# Patient Record
Sex: FEMALE | Race: WHITE | Employment: FULL TIME | URBAN - METROPOLITAN AREA
[De-identification: names, ages, dates, MRNs, and addresses within clinical notes are randomized per-mention and may not be internally consistent; named-entity substitution may affect disease eponyms.]

---

## 2022-09-05 ENCOUNTER — HOSPITAL ENCOUNTER (INPATIENT)
Age: 52
LOS: 2 days | Discharge: HOME | DRG: 446 | End: 2022-09-07
Attending: EMERGENCY MEDICINE | Admitting: HOSPITALIST
Payer: COMMERCIAL

## 2022-09-05 ENCOUNTER — APPOINTMENT (OUTPATIENT)
Dept: GENERAL RADIOLOGY | Age: 52
DRG: 446 | End: 2022-09-05
Payer: COMMERCIAL

## 2022-09-05 ENCOUNTER — APPOINTMENT (OUTPATIENT)
Dept: CT IMAGING | Age: 52
DRG: 446 | End: 2022-09-05
Payer: COMMERCIAL

## 2022-09-05 DIAGNOSIS — N83.202 LEFT OVARIAN CYST: ICD-10-CM

## 2022-09-05 DIAGNOSIS — R79.89 ELEVATED LFTS: ICD-10-CM

## 2022-09-05 DIAGNOSIS — K81.0 ACUTE CHOLECYSTITIS: Primary | ICD-10-CM

## 2022-09-05 LAB
A/G RATIO: 1.6 (ref 1.1–2.2)
ALBUMIN SERPL-MCNC: 4.6 G/DL (ref 3.4–5)
ALP BLD-CCNC: 160 U/L (ref 40–129)
ALT SERPL-CCNC: 88 U/L (ref 10–40)
ANION GAP SERPL CALCULATED.3IONS-SCNC: 9 MMOL/L (ref 3–16)
AST SERPL-CCNC: 198 U/L (ref 15–37)
BASOPHILS ABSOLUTE: 0 K/UL (ref 0–0.2)
BASOPHILS RELATIVE PERCENT: 0.6 %
BILIRUB SERPL-MCNC: 0.6 MG/DL (ref 0–1)
BILIRUBIN URINE: NEGATIVE
BLOOD, URINE: NEGATIVE
BUN BLDV-MCNC: 11 MG/DL (ref 7–20)
CALCIUM SERPL-MCNC: 9.7 MG/DL (ref 8.3–10.6)
CHLORIDE BLD-SCNC: 103 MMOL/L (ref 99–110)
CLARITY: CLEAR
CO2: 24 MMOL/L (ref 21–32)
COLOR: YELLOW
CREAT SERPL-MCNC: 0.6 MG/DL (ref 0.6–1.1)
EOSINOPHILS ABSOLUTE: 0.1 K/UL (ref 0–0.6)
EOSINOPHILS RELATIVE PERCENT: 1.4 %
GFR AFRICAN AMERICAN: >60
GFR NON-AFRICAN AMERICAN: >60
GLUCOSE BLD-MCNC: 115 MG/DL (ref 70–99)
GLUCOSE BLD-MCNC: 122 MG/DL (ref 70–99)
GLUCOSE URINE: NEGATIVE MG/DL
HCT VFR BLD CALC: 38.5 % (ref 36–48)
HEMOGLOBIN: 13.1 G/DL (ref 12–16)
KETONES, URINE: NEGATIVE MG/DL
LEUKOCYTE ESTERASE, URINE: NEGATIVE
LIPASE: 26 U/L (ref 13–60)
LYMPHOCYTES ABSOLUTE: 1.2 K/UL (ref 1–5.1)
LYMPHOCYTES RELATIVE PERCENT: 18.1 %
MCH RBC QN AUTO: 28.4 PG (ref 26–34)
MCHC RBC AUTO-ENTMCNC: 34 G/DL (ref 31–36)
MCV RBC AUTO: 83.3 FL (ref 80–100)
MICROSCOPIC EXAMINATION: NORMAL
MONOCYTES ABSOLUTE: 0.4 K/UL (ref 0–1.3)
MONOCYTES RELATIVE PERCENT: 5.9 %
NEUTROPHILS ABSOLUTE: 4.9 K/UL (ref 1.7–7.7)
NEUTROPHILS RELATIVE PERCENT: 74 %
NITRITE, URINE: NEGATIVE
PDW BLD-RTO: 14.1 % (ref 12.4–15.4)
PERFORMED ON: ABNORMAL
PH UA: 7 (ref 5–8)
PLATELET # BLD: 259 K/UL (ref 135–450)
PMV BLD AUTO: 7.7 FL (ref 5–10.5)
POTASSIUM REFLEX MAGNESIUM: 3.9 MMOL/L (ref 3.5–5.1)
PROTEIN UA: NEGATIVE MG/DL
RBC # BLD: 4.62 M/UL (ref 4–5.2)
SARS-COV-2, NAAT: NOT DETECTED
SODIUM BLD-SCNC: 136 MMOL/L (ref 136–145)
SPECIFIC GRAVITY UA: 1.02 (ref 1–1.03)
TOTAL PROTEIN: 7.4 G/DL (ref 6.4–8.2)
TROPONIN: <0.01 NG/ML
URINE REFLEX TO CULTURE: NORMAL
URINE TYPE: NORMAL
UROBILINOGEN, URINE: 0.2 E.U./DL
WBC # BLD: 6.7 K/UL (ref 4–11)

## 2022-09-05 PROCEDURE — 80053 COMPREHEN METABOLIC PANEL: CPT

## 2022-09-05 PROCEDURE — 96375 TX/PRO/DX INJ NEW DRUG ADDON: CPT

## 2022-09-05 PROCEDURE — 96365 THER/PROPH/DIAG IV INF INIT: CPT

## 2022-09-05 PROCEDURE — 2580000003 HC RX 258: Performed by: EMERGENCY MEDICINE

## 2022-09-05 PROCEDURE — 96376 TX/PRO/DX INJ SAME DRUG ADON: CPT

## 2022-09-05 PROCEDURE — 87635 SARS-COV-2 COVID-19 AMP PRB: CPT

## 2022-09-05 PROCEDURE — 36415 COLL VENOUS BLD VENIPUNCTURE: CPT

## 2022-09-05 PROCEDURE — 6360000004 HC RX CONTRAST MEDICATION: Performed by: EMERGENCY MEDICINE

## 2022-09-05 PROCEDURE — 1200000000 HC SEMI PRIVATE

## 2022-09-05 PROCEDURE — 6360000002 HC RX W HCPCS: Performed by: EMERGENCY MEDICINE

## 2022-09-05 PROCEDURE — 83690 ASSAY OF LIPASE: CPT

## 2022-09-05 PROCEDURE — 71045 X-RAY EXAM CHEST 1 VIEW: CPT

## 2022-09-05 PROCEDURE — 99285 EMERGENCY DEPT VISIT HI MDM: CPT

## 2022-09-05 PROCEDURE — 85025 COMPLETE CBC W/AUTO DIFF WBC: CPT

## 2022-09-05 PROCEDURE — 81003 URINALYSIS AUTO W/O SCOPE: CPT

## 2022-09-05 PROCEDURE — 74177 CT ABD & PELVIS W/CONTRAST: CPT

## 2022-09-05 PROCEDURE — 84484 ASSAY OF TROPONIN QUANT: CPT

## 2022-09-05 RX ORDER — ONDANSETRON 2 MG/ML
4 INJECTION INTRAMUSCULAR; INTRAVENOUS EVERY 6 HOURS PRN
Status: DISCONTINUED | OUTPATIENT
Start: 2022-09-05 | End: 2022-09-07 | Stop reason: HOSPADM

## 2022-09-05 RX ORDER — METRONIDAZOLE 500 MG/100ML
500 INJECTION, SOLUTION INTRAVENOUS EVERY 8 HOURS
Status: DISCONTINUED | OUTPATIENT
Start: 2022-09-06 | End: 2022-09-06

## 2022-09-05 RX ORDER — SODIUM CHLORIDE 0.9 % (FLUSH) 0.9 %
5-40 SYRINGE (ML) INJECTION PRN
Status: DISCONTINUED | OUTPATIENT
Start: 2022-09-05 | End: 2022-09-07 | Stop reason: HOSPADM

## 2022-09-05 RX ORDER — ONDANSETRON 2 MG/ML
4 INJECTION INTRAMUSCULAR; INTRAVENOUS ONCE
Status: COMPLETED | OUTPATIENT
Start: 2022-09-05 | End: 2022-09-05

## 2022-09-05 RX ORDER — SODIUM CHLORIDE 9 MG/ML
INJECTION, SOLUTION INTRAVENOUS PRN
Status: DISCONTINUED | OUTPATIENT
Start: 2022-09-05 | End: 2022-09-07 | Stop reason: HOSPADM

## 2022-09-05 RX ORDER — POLYETHYLENE GLYCOL 3350 17 G/17G
17 POWDER, FOR SOLUTION ORAL DAILY PRN
Status: DISCONTINUED | OUTPATIENT
Start: 2022-09-05 | End: 2022-09-07 | Stop reason: HOSPADM

## 2022-09-05 RX ORDER — ONDANSETRON 4 MG/1
4 TABLET, ORALLY DISINTEGRATING ORAL EVERY 8 HOURS PRN
Status: DISCONTINUED | OUTPATIENT
Start: 2022-09-05 | End: 2022-09-07 | Stop reason: HOSPADM

## 2022-09-05 RX ORDER — ACETAMINOPHEN 650 MG/1
650 SUPPOSITORY RECTAL EVERY 6 HOURS PRN
Status: DISCONTINUED | OUTPATIENT
Start: 2022-09-05 | End: 2022-09-07 | Stop reason: HOSPADM

## 2022-09-05 RX ORDER — SODIUM CHLORIDE 0.9 % (FLUSH) 0.9 %
5-40 SYRINGE (ML) INJECTION EVERY 12 HOURS SCHEDULED
Status: DISCONTINUED | OUTPATIENT
Start: 2022-09-05 | End: 2022-09-07 | Stop reason: HOSPADM

## 2022-09-05 RX ORDER — ACETAMINOPHEN 325 MG/1
650 TABLET ORAL EVERY 6 HOURS PRN
Status: DISCONTINUED | OUTPATIENT
Start: 2022-09-05 | End: 2022-09-07 | Stop reason: HOSPADM

## 2022-09-05 RX ORDER — ENOXAPARIN SODIUM 100 MG/ML
40 INJECTION SUBCUTANEOUS DAILY
Status: DISCONTINUED | OUTPATIENT
Start: 2022-09-06 | End: 2022-09-07 | Stop reason: HOSPADM

## 2022-09-05 RX ORDER — 0.9 % SODIUM CHLORIDE 0.9 %
1000 INTRAVENOUS SOLUTION INTRAVENOUS ONCE
Status: COMPLETED | OUTPATIENT
Start: 2022-09-05 | End: 2022-09-05

## 2022-09-05 RX ORDER — MULTIVIT-MIN/IRON/FOLIC ACID/K 18-600-40
CAPSULE ORAL DAILY
COMMUNITY

## 2022-09-05 RX ORDER — HYDROMORPHONE HYDROCHLORIDE 2 MG/1
1 TABLET ORAL EVERY 4 HOURS PRN
Status: DISCONTINUED | OUTPATIENT
Start: 2022-09-05 | End: 2022-09-05 | Stop reason: ALTCHOICE

## 2022-09-05 RX ORDER — SODIUM CHLORIDE 9 MG/ML
INJECTION, SOLUTION INTRAVENOUS CONTINUOUS
Status: DISCONTINUED | OUTPATIENT
Start: 2022-09-05 | End: 2022-09-06

## 2022-09-05 RX ADMIN — ONDANSETRON HYDROCHLORIDE 4 MG: 2 INJECTION, SOLUTION INTRAMUSCULAR; INTRAVENOUS at 21:01

## 2022-09-05 RX ADMIN — IOPAMIDOL 75 ML: 755 INJECTION, SOLUTION INTRAVENOUS at 19:23

## 2022-09-05 RX ADMIN — CEFTRIAXONE SODIUM 1000 MG: 1 INJECTION, POWDER, FOR SOLUTION INTRAMUSCULAR; INTRAVENOUS at 21:06

## 2022-09-05 RX ADMIN — HYDROMORPHONE HYDROCHLORIDE 1 MG: 1 INJECTION, SOLUTION INTRAMUSCULAR; INTRAVENOUS; SUBCUTANEOUS at 18:46

## 2022-09-05 RX ADMIN — SODIUM CHLORIDE 1000 ML: 9 INJECTION, SOLUTION INTRAVENOUS at 21:06

## 2022-09-05 RX ADMIN — ONDANSETRON HYDROCHLORIDE 4 MG: 2 INJECTION, SOLUTION INTRAMUSCULAR; INTRAVENOUS at 18:45

## 2022-09-05 RX ADMIN — SODIUM CHLORIDE 1000 ML: 9 INJECTION, SOLUTION INTRAVENOUS at 18:44

## 2022-09-05 RX ADMIN — HYDROMORPHONE HYDROCHLORIDE 1 MG: 1 INJECTION, SOLUTION INTRAMUSCULAR; INTRAVENOUS; SUBCUTANEOUS at 21:07

## 2022-09-05 ASSESSMENT — PAIN DESCRIPTION - LOCATION
LOCATION: ABDOMEN
LOCATION: ABDOMEN

## 2022-09-05 ASSESSMENT — ENCOUNTER SYMPTOMS
VOICE CHANGE: 0
FACIAL SWELLING: 0
STRIDOR: 0
COLOR CHANGE: 0
TROUBLE SWALLOWING: 0
ABDOMINAL PAIN: 1
WHEEZING: 0
NAUSEA: 1
VOMITING: 0
SHORTNESS OF BREATH: 0

## 2022-09-05 ASSESSMENT — PAIN SCALES - GENERAL
PAINLEVEL_OUTOF10: 10
PAINLEVEL_OUTOF10: 6
PAINLEVEL_OUTOF10: 5

## 2022-09-05 ASSESSMENT — PAIN DESCRIPTION - ORIENTATION: ORIENTATION: MID

## 2022-09-05 ASSESSMENT — PAIN - FUNCTIONAL ASSESSMENT: PAIN_FUNCTIONAL_ASSESSMENT: 0-10

## 2022-09-05 NOTE — ED PROVIDER NOTES
supple. Skin:     General: Skin is warm and dry. Neurological:      Mental Status: She is alert. Cranial Nerves: No cranial nerve deficit. DIAGNOSTIC RESULTS     RADIOLOGY:     Interpretation per the Radiologist below, if available at the time of this note:    CT ABDOMEN PELVIS W IV CONTRAST Additional Contrast? None   Final Result   Mildly distended gallbladder with gallbladder wall thickening. Findings may   reflect cholecystitis. Recommend further evaluation with right upper   quadrant ultrasound. 4.7 cm complex left adnexal cyst.  Recommend further evaluation with pelvic   ultrasound. XR CHEST PORTABLE   Final Result   1. Left basilar airspace disease either due to atelectasis, pleural fluid or   developing pneumonia. ED BEDSIDE ULTRASOUND:   Performed by ED Physician - none    LABS:  Labs Reviewed   COMPREHENSIVE METABOLIC PANEL W/ REFLEX TO MG FOR LOW K - Abnormal; Notable for the following components:       Result Value    Glucose 122 (*)     Alkaline Phosphatase 160 (*)     ALT 88 (*)      (*)     All other components within normal limits   COVID-19, RAPID   CBC WITH AUTO DIFFERENTIAL   LIPASE   TROPONIN   URINALYSIS WITH REFLEX TO CULTURE       All otherlabs were within normal range or not returned as of this dictation. EMERGENCY DEPARTMENT COURSE and DIFFERENTIAL DIAGNOSIS/MDM:   Vitals:    Vitals:    09/05/22 1729 09/05/22 1738 09/05/22 1841   BP: (!) 178/97  (!) 172/91   Pulse: 98  91   Resp:   16   Temp: 97.4 °F (36.3 °C)     TempSrc: Oral     SpO2: 98%  96%   Weight: 195 lb (88.5 kg) 195 lb (88.5 kg)    Height: 5' 4\" (1.626 m) 5' 4\" (1.626 m)          Is this patient to be included in the SEP-1 Core Measure due to severe sepsis or septic shock?    No   Exclusion criteria - the patient is NOT to be included for SEP-1 Core Measure due to:  2+ SIRS criteria are not met      MDM  Work-up is concerning for acute cholecystitis likely from gallstones with elevated LFTs. Patient also has an subtle finding of left ovarian cyst but has no pain in this area. I have informed her of this and likely need for outpatient ultrasound. Patient would prefer to follow-up with her own OB/GYN for this which I feel is reasonable. The importance of outpatient follow-up for ultrasound is discussed with the patient and her . Patient admitted for further care. Patient continuously monitored throughout her stay in the emergency department and give multiple doses of IV fluids, antiemetics, and 1 dose of IV pain medication. Patient admitted for further evaluation and care. Patient expresses understanding and agreement with this plan. Critical Care    Date/Time: 9/5/2022 8:55 PM  Performed by: Eliud Russo MD  Authorized by: Eilud Russo MD     Critical care provider statement:     Critical care time (minutes):  35    Critical care time was exclusive of:  Separately billable procedures and treating other patients and teaching time    Critical care was necessary to treat or prevent imminent or life-threatening deterioration of the following conditions:  Hepatic failure and shock    Critical care was time spent personally by me on the following activities:  Ordering and performing treatments and interventions, development of treatment plan with patient or surrogate, ordering and review of radiographic studies, discussions with consultants, ordering and review of laboratory studies, evaluation of patient's response to treatment, re-evaluation of patient's condition, examination of patient and obtaining history from patient or surrogate    FINAL IMPRESSION      1. Acute cholecystitis    2. Elevated LFTs    3.  Left ovarian cyst          DISPOSITION/PLAN   DISPOSITION Decision To Admit 09/05/2022 08:53:37 PM             (Please note that portions of this note were completed with a voice recognition program.  Efforts were made to edit the dictations but occasionally words aremis-transcribed. )    Triston Villatoro MD (electronically signed)  Attending Emergency Physician           Triston Villatoro MD  09/05/22 2052

## 2022-09-05 NOTE — LETTER
[phone]  Phone: [phone]             September 7, 2022    Patient: Bridgette Reaves   YOB: 1970   Date of Visit: 9/5/2022       To Whom It May Concern:    Bridgette Mcqueen was seen and treated in our facility  beginning 9/5/2022 until  9/7/2022.       Sincerely,       Flip Vu RN         Signature:__________________________________

## 2022-09-06 ENCOUNTER — APPOINTMENT (OUTPATIENT)
Dept: MRI IMAGING | Age: 52
DRG: 446 | End: 2022-09-06
Payer: COMMERCIAL

## 2022-09-06 PROBLEM — R79.89 ELEVATED LFTS: Status: ACTIVE | Noted: 2022-09-06

## 2022-09-06 PROBLEM — N83.202 LEFT OVARIAN CYST: Status: ACTIVE | Noted: 2022-09-06

## 2022-09-06 LAB
ALBUMIN SERPL-MCNC: 4.2 G/DL (ref 3.4–5)
ALP BLD-CCNC: 155 U/L (ref 40–129)
ALT SERPL-CCNC: 211 U/L (ref 10–40)
ANION GAP SERPL CALCULATED.3IONS-SCNC: 8 MMOL/L (ref 3–16)
AST SERPL-CCNC: 258 U/L (ref 15–37)
BASOPHILS ABSOLUTE: 0 K/UL (ref 0–0.2)
BASOPHILS RELATIVE PERCENT: 0.1 %
BILIRUB SERPL-MCNC: 1.4 MG/DL (ref 0–1)
BILIRUBIN DIRECT: 0.9 MG/DL (ref 0–0.3)
BILIRUBIN, INDIRECT: 0.5 MG/DL (ref 0–1)
BUN BLDV-MCNC: 7 MG/DL (ref 7–20)
CALCIUM SERPL-MCNC: 8.6 MG/DL (ref 8.3–10.6)
CHLORIDE BLD-SCNC: 103 MMOL/L (ref 99–110)
CO2: 25 MMOL/L (ref 21–32)
CREAT SERPL-MCNC: 0.6 MG/DL (ref 0.6–1.1)
EOSINOPHILS ABSOLUTE: 0 K/UL (ref 0–0.6)
EOSINOPHILS RELATIVE PERCENT: 0 %
GFR AFRICAN AMERICAN: >60
GFR NON-AFRICAN AMERICAN: >60
GLUCOSE BLD-MCNC: 105 MG/DL (ref 70–99)
GLUCOSE BLD-MCNC: 136 MG/DL (ref 70–99)
GLUCOSE BLD-MCNC: 136 MG/DL (ref 70–99)
HCT VFR BLD CALC: 36.4 % (ref 36–48)
HEMOGLOBIN: 12.2 G/DL (ref 12–16)
LYMPHOCYTES ABSOLUTE: 0.4 K/UL (ref 1–5.1)
LYMPHOCYTES RELATIVE PERCENT: 3.1 %
MCH RBC QN AUTO: 28.3 PG (ref 26–34)
MCHC RBC AUTO-ENTMCNC: 33.5 G/DL (ref 31–36)
MCV RBC AUTO: 84.3 FL (ref 80–100)
MONOCYTES ABSOLUTE: 0.5 K/UL (ref 0–1.3)
MONOCYTES RELATIVE PERCENT: 4.3 %
NEUTROPHILS ABSOLUTE: 10.4 K/UL (ref 1.7–7.7)
NEUTROPHILS RELATIVE PERCENT: 92.5 %
PDW BLD-RTO: 14.8 % (ref 12.4–15.4)
PERFORMED ON: ABNORMAL
PERFORMED ON: ABNORMAL
PLATELET # BLD: 233 K/UL (ref 135–450)
PMV BLD AUTO: 7.8 FL (ref 5–10.5)
POTASSIUM REFLEX MAGNESIUM: 3.6 MMOL/L (ref 3.5–5.1)
RBC # BLD: 4.32 M/UL (ref 4–5.2)
SODIUM BLD-SCNC: 136 MMOL/L (ref 136–145)
TOTAL PROTEIN: 6.5 G/DL (ref 6.4–8.2)
WBC # BLD: 11.2 K/UL (ref 4–11)

## 2022-09-06 PROCEDURE — 6370000000 HC RX 637 (ALT 250 FOR IP): Performed by: HOSPITALIST

## 2022-09-06 PROCEDURE — 6360000002 HC RX W HCPCS: Performed by: HOSPITALIST

## 2022-09-06 PROCEDURE — 2580000003 HC RX 258: Performed by: HOSPITALIST

## 2022-09-06 PROCEDURE — 2500000003 HC RX 250 WO HCPCS: Performed by: HOSPITALIST

## 2022-09-06 PROCEDURE — C9113 INJ PANTOPRAZOLE SODIUM, VIA: HCPCS | Performed by: INTERNAL MEDICINE

## 2022-09-06 PROCEDURE — 6360000002 HC RX W HCPCS: Performed by: INTERNAL MEDICINE

## 2022-09-06 PROCEDURE — 80076 HEPATIC FUNCTION PANEL: CPT

## 2022-09-06 PROCEDURE — 99221 1ST HOSP IP/OBS SF/LOW 40: CPT | Performed by: SURGERY

## 2022-09-06 PROCEDURE — 6370000000 HC RX 637 (ALT 250 FOR IP): Performed by: INTERNAL MEDICINE

## 2022-09-06 PROCEDURE — 99222 1ST HOSP IP/OBS MODERATE 55: CPT | Performed by: INTERNAL MEDICINE

## 2022-09-06 PROCEDURE — 36415 COLL VENOUS BLD VENIPUNCTURE: CPT

## 2022-09-06 PROCEDURE — 74181 MRI ABDOMEN W/O CONTRAST: CPT

## 2022-09-06 PROCEDURE — 85025 COMPLETE CBC W/AUTO DIFF WBC: CPT

## 2022-09-06 PROCEDURE — 1200000000 HC SEMI PRIVATE

## 2022-09-06 PROCEDURE — 80048 BASIC METABOLIC PNL TOTAL CA: CPT

## 2022-09-06 RX ORDER — PANTOPRAZOLE SODIUM 40 MG/10ML
40 INJECTION, POWDER, LYOPHILIZED, FOR SOLUTION INTRAVENOUS DAILY
Status: DISCONTINUED | OUTPATIENT
Start: 2022-09-06 | End: 2022-09-07 | Stop reason: HOSPADM

## 2022-09-06 RX ORDER — PROCHLORPERAZINE EDISYLATE 5 MG/ML
10 INJECTION INTRAMUSCULAR; INTRAVENOUS EVERY 6 HOURS PRN
Status: DISCONTINUED | OUTPATIENT
Start: 2022-09-06 | End: 2022-09-07 | Stop reason: HOSPADM

## 2022-09-06 RX ORDER — AMOXICILLIN AND CLAVULANATE POTASSIUM 875; 125 MG/1; MG/1
1 TABLET, FILM COATED ORAL EVERY 12 HOURS SCHEDULED
Status: DISCONTINUED | OUTPATIENT
Start: 2022-09-06 | End: 2022-09-07 | Stop reason: HOSPADM

## 2022-09-06 RX ADMIN — PANTOPRAZOLE SODIUM 40 MG: 40 INJECTION, POWDER, FOR SOLUTION INTRAVENOUS at 11:05

## 2022-09-06 RX ADMIN — PROCHLORPERAZINE EDISYLATE 10 MG: 5 INJECTION INTRAMUSCULAR; INTRAVENOUS at 10:12

## 2022-09-06 RX ADMIN — ACETAMINOPHEN 650 MG: 325 TABLET ORAL at 21:27

## 2022-09-06 RX ADMIN — ENOXAPARIN SODIUM 40 MG: 100 INJECTION SUBCUTANEOUS at 10:09

## 2022-09-06 RX ADMIN — HYDROMORPHONE HYDROCHLORIDE 1 MG: 1 INJECTION, SOLUTION INTRAMUSCULAR; INTRAVENOUS; SUBCUTANEOUS at 00:05

## 2022-09-06 RX ADMIN — METRONIDAZOLE 500 MG: 500 INJECTION, SOLUTION INTRAVENOUS at 08:42

## 2022-09-06 RX ADMIN — ONDANSETRON HYDROCHLORIDE 4 MG: 2 INJECTION, SOLUTION INTRAMUSCULAR; INTRAVENOUS at 00:06

## 2022-09-06 RX ADMIN — AMOXICILLIN AND CLAVULANATE POTASSIUM 1 TABLET: 875; 125 TABLET, FILM COATED ORAL at 20:41

## 2022-09-06 RX ADMIN — ONDANSETRON HYDROCHLORIDE 4 MG: 2 INJECTION, SOLUTION INTRAMUSCULAR; INTRAVENOUS at 05:06

## 2022-09-06 RX ADMIN — SODIUM CHLORIDE: 9 INJECTION, SOLUTION INTRAVENOUS at 00:00

## 2022-09-06 RX ADMIN — METRONIDAZOLE 500 MG: 500 INJECTION, SOLUTION INTRAVENOUS at 00:01

## 2022-09-06 RX ADMIN — HYDROMORPHONE HYDROCHLORIDE 1 MG: 1 INJECTION, SOLUTION INTRAMUSCULAR; INTRAVENOUS; SUBCUTANEOUS at 05:07

## 2022-09-06 RX ADMIN — Medication 10 ML: at 00:06

## 2022-09-06 ASSESSMENT — PAIN DESCRIPTION - FREQUENCY
FREQUENCY: INTERMITTENT
FREQUENCY: INTERMITTENT

## 2022-09-06 ASSESSMENT — PAIN DESCRIPTION - PAIN TYPE
TYPE: ACUTE PAIN
TYPE: ACUTE PAIN

## 2022-09-06 ASSESSMENT — PAIN - FUNCTIONAL ASSESSMENT
PAIN_FUNCTIONAL_ASSESSMENT: ACTIVITIES ARE NOT PREVENTED
PAIN_FUNCTIONAL_ASSESSMENT: ACTIVITIES ARE NOT PREVENTED

## 2022-09-06 ASSESSMENT — PAIN SCALES - GENERAL
PAINLEVEL_OUTOF10: 7
PAINLEVEL_OUTOF10: 10

## 2022-09-06 ASSESSMENT — PAIN DESCRIPTION - LOCATION
LOCATION: ABDOMEN

## 2022-09-06 ASSESSMENT — PAIN DESCRIPTION - DESCRIPTORS
DESCRIPTORS: ACHING;SORE
DESCRIPTORS: ACHING;SORE;JABBING

## 2022-09-06 ASSESSMENT — PAIN DESCRIPTION - ONSET
ONSET: ON-GOING
ONSET: ON-GOING

## 2022-09-06 ASSESSMENT — PAIN DESCRIPTION - ORIENTATION
ORIENTATION: MID

## 2022-09-06 NOTE — CONSULTS
Surgery Consult Note     Elisha Jacques, APRN - CNP, [place]  Pt Name: Bridgette Cardona  MRN: [id number]  YOB: 1970  Date of evaluation: 9/6/2022  Primary Care Physician: Tram Roa [facility],[address]  Patient evaluated at the request of  Dr. Radha Suarez  Reason for evaluation: Acute cholecystitis   IMPRESSIONS:   MRCP: contracted gallbladder, no choledocholithiasis   CT abd and pelvis: mildly distended gallbladder with gallbladder wall thickening ? For acute ghassan   ABD: Obese, soft, no tenderness to deep palpation, N and small emesis early this am but, no denies, + flatus, + BM last night   ALT/AST increased: 88/198->211/258  T bili 0.6->1.4  Lipase normal  Leuks 6.7->11.2  VSS  Pt states \"I am not having any pain now, I would like to eat and go home. \"  does not have any pertinent problems on file. PLANS:   Rocephin/Flagyl  IVF at 75 ml/hr  Pt with acute cholecystitis, ? If she passed a gallstone. Pt can trial a low fat full liquid diet. If she tolerates then she can be discharged home from a surgical standpoint. As she would like to have any surgeries done at [facility] where she is employed and has seen Dr. Raul Osei. SUBJECTIVE:   History of Chief Complaint:    Bridgette Early is a 45 y.o. female who presented to the ER with acute, severe right upper quadrant abdominal pain. The patient states she felt fine yesterday morning. She ate 2 scrambled eggs, toast with peach jelly. She states at noon she ate a Roberto. \" She still felt fine and went to [facility]. She states at 1400, she develop developed pain in the epigastric region radiating to the right described as \"pressure. \" The pain radiated to her back. The pain was so severe, she could hardly get up. She denies fevers or chills. She became nauseated last night and had 3 very small emesis. She reports she was urinating fine but, her urine got dark yesterday. She reports having similar episodes like this in the past but to a lesser degree in severity.  The episodes are about 1 time per week. Currently, the patient states her pain is improved. She states she would like to be discharged home if she can and follow-up with Dr. Rosanne Woodard at [employer] where she works. Past Medical History   has a past medical history of Reflux gastritis. Past Surgical History   has a past surgical history that includes Appendectomy. Medications  Prior to Admission medications    Medication Sig Start Date End Date Taking? Authorizing Provider   Cholecalciferol (VITAMIN D) 50 MCG (2000 UT) CAPS capsule Take by mouth daily Unsure of dose   Yes Historical Provider, MD   esomeprazole Magnesium (NEXIUM) 40 MG PACK Take 40 mg by mouth daily    Historical Provider, MD   cetirizine (ZYRTEC) 5 MG tablet Take 5 mg by mouth daily    Historical Provider, MD    Scheduled Meds:   pantoprazole  40 mg IntraVENous Daily    cefTRIAXone (ROCEPHIN) IV  1,000 mg IntraVENous Q24H    metroNIDAZOLE  500 mg IntraVENous Q8H    sodium chloride flush  5-40 mL IntraVENous 2 times per day    enoxaparin  40 mg SubCUTAneous Daily     Continuous Infusions:   sodium chloride      sodium chloride 75 mL/hr at 09/06/22 0000     PRN Meds:.prochlorperazine, sodium chloride flush, sodium chloride, ondansetron **OR** ondansetron, polyethylene glycol, acetaminophen **OR** acetaminophen, HYDROmorphone **OR** HYDROmorphone    Allergies  is allergic to decadron [dexamethasone]. Family History  family history is not on file. Social History   reports that she has never smoked. She has never used smokeless tobacco. She reports that she does not drink alcohol and does not use drugs.     Review of Systems:  General Denies any fever or chills  HEENT Denies any diplopia, tinnitus or vertigo  Resp Denies any shortness of breath, cough or wheezing  Cardiac Denies any chest pain, palpitations, claudication or edema  GI Positive for reflux, nausea, vomiting, and abdominal pain  Denies any melena, hematochezia, hematemesis or pyrosis   dark urine  Denies any frequency, urgency, hesitancy or incontinence  Heme Denies bruising or bleeding easily  Endocrine Denies any history of diabetes or thyroid disease  Neuro Denies any focal motor or sensory deficits  Musculoskeletal: Moves all extremities     OBJECTIVE:   VITALS:  height is 5' 4\" (1.626 m) and weight is 205 lb 8 oz (93.2 kg). Her oral temperature is 98.1 °F (36.7 °C). Her blood pressure is 144/82 (abnormal) and her pulse is 94. Her respiration is 14 and oxygen saturation is 99%. CONSTITUTIONAL: Alert and oriented times 3, no acute distress and cooperative to examination with proper mood and affect. SKIN: Skin color, texture, turgor normal. No rashes or lesions. HEENT: Head is normocephalic, atraumatic. EOMI, PERRLA. NECK: supple, symmetrical, trachea midline. RESPIRATORY: chest symmetric with normal A/P diameter, normal respiratory rate and rhythm, lungs clear to auscultation without wheezes, rales or rhonchi. No accessory muscle use. CARDIOVASCULAR: Heart sounds are normal.  Regular rate and rhythm without murmur, gallop or rub. Normal S1 and S2. Erleen Crowder GI: Normal shape. Low transverse scar(s) present. Normal bowel sounds. No bruits. . soft, nontender, nondistended, no masses or organomegaly. no evidence of hernia. Tenderness: absent. RECTAL: deferred, not clinically indicated  PSYCH: Normal mood and affect   NEUROLOGIC: There are no focalizing motor or sensory deficits. CN II-XII are grossly intact. Erleen Crowder EXTREMITIES: no cyanosis, no clubbing, and no edema.     LABS:     Recent Labs     09/05/22  1800 09/05/22  1841 09/06/22  0530   WBC  --  6.7 11.2*   HGB  --  13.1 12.2   HCT  --  38.5 36.4   PLT  --  259 233   NA  --  136 136   K  --  3.9 3.6   CL  --  103 103   CO2  --  24 25   BUN  --  11 7   CREATININE  --  0.6 0.6   CALCIUM  --  9.7 8.6   AST  --  198* 258*   ALT  --  88* 211*   BILITOT  --  0.6 1.4*   BILIDIR  --   --  0.9*   NITRU Negative  --   --    COLORU Yellow  --   -- Recent Labs     09/05/22  1841 09/06/22  0530   ALKPHOS 160* 155*   ALT 88* 211*   * 258*   BILITOT 0.6 1.4*   BILIDIR  --  0.9*   LABALBU 4.6 4.2   LIPASE 26.0  --      CBC with Differential:    Lab Results   Component Value Date/Time    WBC 11.2 09/06/2022 05:30 AM    RBC 4.32 09/06/2022 05:30 AM    HGB 12.2 09/06/2022 05:30 AM    HCT 36.4 09/06/2022 05:30 AM     09/06/2022 05:30 AM    MCV 84.3 09/06/2022 05:30 AM    MCH 28.3 09/06/2022 05:30 AM    MCHC 33.5 09/06/2022 05:30 AM    RDW 14.8 09/06/2022 05:30 AM    SEGSPCT 81.0 11/29/2012 07:54 PM    BANDSPCT 11.0 11/29/2012 07:54 PM    LYMPHOPCT 3.1 09/06/2022 05:30 AM    MONOPCT 4.3 09/06/2022 05:30 AM    BASOPCT 0.1 09/06/2022 05:30 AM    MONOSABS 0.5 09/06/2022 05:30 AM    LYMPHSABS 0.4 09/06/2022 05:30 AM    EOSABS 0.0 09/06/2022 05:30 AM    BASOSABS 0.0 09/06/2022 05:30 AM    DIFFTYPE Manual 11/29/2012 07:54 PM     CMP:    Lab Results   Component Value Date/Time     09/06/2022 05:30 AM    K 3.6 09/06/2022 05:30 AM     09/06/2022 05:30 AM    CO2 25 09/06/2022 05:30 AM    BUN 7 09/06/2022 05:30 AM    CREATININE 0.6 09/06/2022 05:30 AM    GFRAA >60 09/06/2022 05:30 AM    GFRAA >60 11/29/2012 07:54 PM    AGRATIO 1.6 09/05/2022 06:41 PM    LABGLOM >60 09/06/2022 05:30 AM    GLUCOSE 136 09/06/2022 05:30 AM    PROT 6.5 09/06/2022 05:30 AM    PROT 8.2 11/29/2012 07:54 PM    LABALBU 4.2 09/06/2022 05:30 AM    CALCIUM 8.6 09/06/2022 05:30 AM    BILITOT 1.4 09/06/2022 05:30 AM    ALKPHOS 155 09/06/2022 05:30 AM     09/06/2022 05:30 AM     09/06/2022 05:30 AM         RADIOLOGY:   I have personally reviewed the following films:    CT scan abd/pelvis: See radiology report    Thank you for the interesting evaluation. Further recommendations to follow. Electronically signed by ERICA Schwartz CNP on 9/6/2022 at 4:36 PM    Patient seen and examined. I agree with the assessment and plan from ELI Smallwood.   More than half of the time spent on this encounter was completed by me including the history, physical examination and the entire medical decision making. Patient with recurrent RUQ and upper abdominal pain. This episode was the worst.  Pain resolved now. LFT elevated. CT with distended GB with some GB wall thickening. MRCP with contracted GB and no CBD stone. Abdomen soft. Non distended. Non tender. She wants to have GB surgery at [facility] with her surgeon. If she tolerates PO then [id number] [address] to discharge home to follow up with Dr. Onel Amaya.      Deedee Andrea MD

## 2022-09-06 NOTE — FLOWSHEET NOTE
09/06/22 0819   Vital Signs   Temp 98.2 °F (36.8 °C)   Temp Source Oral   Heart Rate 80   Resp 16   /78   BP Location Left upper arm   BP Method Automatic   MAP (Calculated) 90.67   Patient Position Semi fowlers   Level of Consciousness 0   MEWS Score 1   Pain Assessment   Pain Location Abdomen   Pain Orientation Mid   Oxygen Therapy   SpO2 99 %     AM assessment completed and vital signs completed, see flowsheet. Vital signs are WNL. Patient is alert & oriented. No complaints voiced. Patient denies further needs. Side rails up X 2. Bed in the lowest position. Call light within reach.

## 2022-09-06 NOTE — CARE COORDINATION
Review of chart for any potential discharge needs. No needs identified for discharge intervention at this time. MD and bedside RN  if needs arise please consult case management for discharge intervention. CM not following at this time. Pt is on RA with SpO2 of 94%. Dx: acute cholecystitis.

## 2022-09-06 NOTE — PLAN OF CARE
Problem: Discharge Planning  Goal: Discharge to home or other facility with appropriate resources  9/6/2022 1025 by Alana Quintanilla RN  Outcome: Progressing  Flowsheets (Taken 9/6/2022 1020)  Discharge to home or other facility with appropriate resources:   Identify barriers to discharge with patient and caregiver   Arrange for needed discharge resources and transportation as appropriate   Identify discharge learning needs (meds, wound care, etc)   Arrange for interpreters to assist at discharge as needed   Refer to discharge planning if patient needs post-hospital services based on physician order or complex needs related to functional status, cognitive ability or social support system  9/6/2022 0035 by Kelechi Ryan RN  Outcome: Progressing  Flowsheets (Taken 9/6/2022 0033)  Discharge to home or other facility with appropriate resources: Identify barriers to discharge with patient and caregiver     Problem: ABCDS Injury Assessment  Goal: Absence of physical injury  9/6/2022 1025 by Alana Quintanilla RN  Outcome: Progressing  Flowsheets (Taken 9/6/2022 1025)  Absence of Physical Injury: Implement safety measures based on patient assessment  9/6/2022 0035 by Kelechi Ryan RN  Outcome: Progressing     Problem: Pain  Goal: Verbalizes/displays adequate comfort level or baseline comfort level  Outcome: Progressing

## 2022-09-06 NOTE — FLOWSHEET NOTE
09/06/22 1920   Handoff   Communication Given Shift Handoff   Handoff Given To Jozef West Received From [facility] Communication Face to Face; At bedside   Time Handoff Given 1920   End of Shift Check Performed Yes

## 2022-09-06 NOTE — FLOWSHEET NOTE
09/05/22 2334   Vital Signs   Temp 98.7 °F (37.1 °C)   Temp Source Oral   Heart Rate (!) 118   Resp 20   BP (!) 157/88   BP Location Left upper arm   MAP (Calculated) 111   Patient Position Semi fowlers   Level of Consciousness 0   MEWS Score 3   Oxygen Therapy   SpO2 93 %   O2 Device None (Room air)   Height and Weight   Height 5' 4\" (1.626 m)   Weight 205 lb 8 oz (93.2 kg)   Weight Method Bed scale   BSA (Calculated - sq m) 2.05 sq meters   BMI (Calculated) 35.3   Admission questions complete at this time per pt report. Assessment complete- see flowsheets. Pt defers 4eyes, A&Ox4, denies presence of any areas of redness or wounds to skin and none are visible at this time. PRN medication administered for c/o 10/10 abdominal pain and nausea, see eMAR and flowsheets for associated medication administration information. Pt aware NPO, has call light within reach, denies further needs, and is aware to call for any needs or changes. Patient is able to demonstrate the ability to move from a reclining position to an upright position within the recliner. CHG wipes provided to pt for independent use as declines need for assistance. Will continue to monitor.   Lukas Simms RN

## 2022-09-07 VITALS
BODY MASS INDEX: 35.08 KG/M2 | TEMPERATURE: 98.7 F | DIASTOLIC BLOOD PRESSURE: 87 MMHG | WEIGHT: 205.5 LBS | HEIGHT: 64 IN | OXYGEN SATURATION: 94 % | SYSTOLIC BLOOD PRESSURE: 147 MMHG | HEART RATE: 88 BPM | RESPIRATION RATE: 16 BRPM

## 2022-09-07 LAB
A/G RATIO: 1.8 (ref 1.1–2.2)
ALBUMIN SERPL-MCNC: 4.2 G/DL (ref 3.4–5)
ALP BLD-CCNC: 164 U/L (ref 40–129)
ALT SERPL-CCNC: 134 U/L (ref 10–40)
ANION GAP SERPL CALCULATED.3IONS-SCNC: 10 MMOL/L (ref 3–16)
AST SERPL-CCNC: 75 U/L (ref 15–37)
BILIRUB SERPL-MCNC: 1 MG/DL (ref 0–1)
BUN BLDV-MCNC: 6 MG/DL (ref 7–20)
CALCIUM SERPL-MCNC: 9.2 MG/DL (ref 8.3–10.6)
CHLORIDE BLD-SCNC: 101 MMOL/L (ref 99–110)
CO2: 27 MMOL/L (ref 21–32)
CREAT SERPL-MCNC: 0.7 MG/DL (ref 0.6–1.1)
GFR AFRICAN AMERICAN: >60
GFR NON-AFRICAN AMERICAN: >60
GLUCOSE BLD-MCNC: 108 MG/DL (ref 70–99)
HCT VFR BLD CALC: 36.6 % (ref 36–48)
HEMOGLOBIN: 12.2 G/DL (ref 12–16)
MCH RBC QN AUTO: 28.1 PG (ref 26–34)
MCHC RBC AUTO-ENTMCNC: 33.4 G/DL (ref 31–36)
MCV RBC AUTO: 84 FL (ref 80–100)
PDW BLD-RTO: 14.2 % (ref 12.4–15.4)
PLATELET # BLD: 207 K/UL (ref 135–450)
PMV BLD AUTO: 8 FL (ref 5–10.5)
POTASSIUM SERPL-SCNC: 4 MMOL/L (ref 3.5–5.1)
RBC # BLD: 4.35 M/UL (ref 4–5.2)
SODIUM BLD-SCNC: 138 MMOL/L (ref 136–145)
TOTAL PROTEIN: 6.5 G/DL (ref 6.4–8.2)
WBC # BLD: 5.4 K/UL (ref 4–11)

## 2022-09-07 PROCEDURE — 36415 COLL VENOUS BLD VENIPUNCTURE: CPT

## 2022-09-07 PROCEDURE — 85027 COMPLETE CBC AUTOMATED: CPT

## 2022-09-07 PROCEDURE — 99238 HOSP IP/OBS DSCHRG MGMT 30/<: CPT | Performed by: INTERNAL MEDICINE

## 2022-09-07 PROCEDURE — 80053 COMPREHEN METABOLIC PANEL: CPT

## 2022-09-07 PROCEDURE — 6370000000 HC RX 637 (ALT 250 FOR IP): Performed by: HOSPITALIST

## 2022-09-07 PROCEDURE — 6370000000 HC RX 637 (ALT 250 FOR IP): Performed by: INTERNAL MEDICINE

## 2022-09-07 RX ORDER — AMOXICILLIN AND CLAVULANATE POTASSIUM 875; 125 MG/1; MG/1
1 TABLET, FILM COATED ORAL EVERY 12 HOURS SCHEDULED
Qty: 10 TABLET | Refills: 0 | Status: SHIPPED | OUTPATIENT
Start: 2022-09-07 | End: 2022-09-12

## 2022-09-07 RX ADMIN — ACETAMINOPHEN 650 MG: 325 TABLET ORAL at 05:25

## 2022-09-07 RX ADMIN — AMOXICILLIN AND CLAVULANATE POTASSIUM 1 TABLET: 875; 125 TABLET, FILM COATED ORAL at 09:31

## 2022-09-07 NOTE — DISCHARGE SUMMARY
Name:  Kevon Lindsay  Room:  0222/0222-01  MRN:    [id number]    Discharge Summary      This discharge summary is in conjunction with a complete physical exam done on the day of discharge. Discharging Physician: Jonnie Pozo MD      Admit: 9/5/2022  Discharge:  9/7/2022    Diagnoses this Admission    Principal Problem:    Acute cholecystitis  Active Problems:    Elevated LFTs    Left ovarian cyst  Resolved Problems:    * No resolved hospital problems. *          Procedures (Please Review Full Report for Details)      Consults    IP CONSULT TO HOSPITALIST  IP CONSULT TO GENERAL SURGERY      HPI:  The patient is a 45 y.o. female with GERD who presented to [facility] ED with complaint of abdominal pain. Onset was abrupt and started yesterday approximately 1400. Located in epigastric and RUQ. Endorses pressure in her abdomen and states she gets SOB and experiences back pain when she takes a deep breath. She also has associated nausea without vomiting. She is still having Bms and passing flatus. She denies fevers, chills, cough, chest pain, palpitations, diarrhea, numbness, tingling or swelling in her extremities. BP initially elevated, improved. Remainder of vitals are stable. Labs remarkable for mild leukocytosis today 11.2, initially 6.7. She has elevated alk phos, bilirubin and transaminitis. CT with possible cholecystitis. She is admitted for further care. Physical Exam at Discharge:  /62   Pulse 99   Temp 99.5 °F (37.5 °C) (Oral)   Resp 16   Ht 5' 4\" (1.626 m)   Wt 205 lb 8 oz (93.2 kg)   SpO2 93%   Breastfeeding No   BMI 35.27 kg/m²     Gen: No distress. Alert. Eyes: PERRL. No sclera icterus. No conjunctival injection. ENT: No discharge. Pharynx clear. Neck: Trachea midline. Normal thyroid. Resp: No accessory muscle use. No crackles. No wheezes. No rhonchi. No dullness on percussion. CV: Regular rate. Regular rhythm. No murmur or rub. No edema. GI: Non-tender. Non-distended. No masses. No organomegaly. Normal bowel sounds. No hernia. Skin: Warm and dry. No nodule on exposed extremities. No rash on exposed extremities. Lymph: No cervical LAD. No supraclavicular LAD. M/S: No cyanosis. No joint deformity. No clubbing. Neuro: Awake. Moves all 4 extremities, non focal  Psych: Oriented x 3. No anxiety or agitation. Hospital Course      #Acute cholecystitis   -CT above  -MRCP ordered-normal.  -NPO, IVF  -Pain control and antiemetics  -Rocephin and flagyl D#2  -General surgery consulted  Started on a diet. Tolerating well. No abdominal pain today. LFTs declining. She will follow-up with surgeon at the [facility] where she works, for cholecystectomy     #Transaminitis  #Hyperbilirubinemia  #Elevated alk phos  #Leukocytosis  -2/2 above     Left adnexal cyst.  Needs outpatient follow-up. Discussed with patient and patient's  at bedside. #GERD  -PPI held with NPO     #Hiatal hernia  -relatively new diagnosis  -being worked up for this OP      MRI ABDOMEN WO CONTRAST MRCP   Final Result   No significant biliary ductal dilatation. No retained common duct stone      Decreased gallbladder distention compared to prior CT scan. There is trace   pericholecystic fluid, a nonspecific finding      Mild signal loss seen in the liver on out of phase images, compatible with   fatty infiltration         CT ABDOMEN PELVIS W IV CONTRAST Additional Contrast? None   Final Result   Mildly distended gallbladder with gallbladder wall thickening. Findings may   reflect cholecystitis. Recommend further evaluation with right upper   quadrant ultrasound. 4.7 cm complex left adnexal cyst.  Recommend further evaluation with pelvic   ultrasound. XR CHEST PORTABLE   Final Result   1. Left basilar airspace disease either due to atelectasis, pleural fluid or   developing pneumonia.                 Discharge Medications     Medication List        START taking these medications      amoxicillin-clavulanate 875-125 MG per tablet  Commonly known as: AUGMENTIN  Take 1 tablet by mouth every 12 hours for 5 days            CONTINUE taking these medications      cetirizine 5 MG tablet  Commonly known as: ZYRTEC     esomeprazole Magnesium 40 MG Pack  Commonly known as: NEXIUM     vitamin D 50 MCG (2000 UT) Caps capsule               Where to Get Your Medications        These medications were sent to [place] [id number] [facility] [address] [facility] [phone] Yolette Postin [phone]  [address] [facility] [address]      Phone: [phone]   amoxicillin-clavulanate 875-125 MG per tablet           Discharge Condition/Location: Stable    Follow Up:   Follow up with PCP and surgery         Roula Lema MD 9/7/2022 9:09 AM

## 2022-09-07 NOTE — PLAN OF CARE
Problem: Discharge Planning  Goal: Discharge to home or other facility with appropriate resources  9/6/2022 2333 by Radha Kang RN  Outcome: Progressing  9/6/2022 1025 by Angel Irwin RN  Outcome: Progressing  Flowsheets (Taken 9/6/2022 1020)  Discharge to home or other facility with appropriate resources:   Identify barriers to discharge with patient and caregiver   Arrange for needed discharge resources and transportation as appropriate   Identify discharge learning needs (meds, wound care, etc)   Arrange for interpreters to assist at discharge as needed   Refer to discharge planning if patient needs post-hospital services based on physician order or complex needs related to functional status, cognitive ability or social support system     Problem: ABCDS Injury Assessment  Goal: Absence of physical injury  9/6/2022 2333 by Radha Kang RN  Outcome: Progressing  9/6/2022 1025 by Angel Irwin RN  Outcome: Progressing  Flowsheets (Taken 9/6/2022 1025)  Absence of Physical Injury: Implement safety measures based on patient assessment     Problem: Pain  Goal: Verbalizes/displays adequate comfort level or baseline comfort level  9/6/2022 2333 by Radha Kang RN  Outcome: Progressing  9/6/2022 1025 by Angel Irwin RN  Outcome: Progressing

## 2022-09-07 NOTE — DISCHARGE INSTRUCTIONS
Your information:  Name: Bridgette Aguirre  : 1970    Your instructions: Follow up with PCP and General surgeon with [facility] within 1-2 weeks. What to do after you leave the hospital:    Recommended diet: low fat, low cholesterol diet    Recommended activity: activity as tolerated        The following personal items were collected during your admission and were returned to you:    Belongings  Dental Appliances: None  Vision - Corrective Lenses: Eyeglasses, At bedside  Hearing Aid: None  Clothing: Shirt, Socks, Undergarments, Shorts, Footwear, At bedside  Jewelry: None  Body Piercings Removed: N/A  Electronic Devices: Cell Phone  Weapons (Notify Protective Services/Security): None  Other Valuables: Seng Gaytan, At bedside  Home Medications: None  Valuables Given To: Patient, Locker  Provide Name(s) of Who Valuable(s) Were Given To: Bridgette Sandoval  Patient approves for provider to speak to responsible person post operatively: Yes    Information obtained by:  By signing below, I understand that if any problems occur once I leave the hospital I am to contact MD.  I understand and acknowledge receipt of the instructions indicated above.

## 2022-09-07 NOTE — FLOWSHEET NOTE
09/07/22 0924   Vital Signs   Temp 98.7 °F (37.1 °C)   Temp Source Oral   Heart Rate 88   Heart Rate Source Monitor   Resp 16   BP (!) 147/87   BP Location Left upper arm   BP Method Automatic   MAP (Calculated) 107   Patient Position Sitting   Level of Consciousness 0   MEWS Score 1   Oxygen Therapy   SpO2 94 %   O2 Device None (Room air)   AM assessment completed and vital signs completed, see flowsheet. Patient is alert & oriented. No complaints voiced. Patient denies further needs. Side rails up X 2. Bed in the lowest position. Call light within reach.

## 2022-09-07 NOTE — PROGRESS NOTES
Shift assessment complete. Alert and oriented. Patient denies any pain at this time. Patient denies any needs at this time. Bed in lowest position. Side rails up x2. Call light in reach.